# Patient Record
Sex: MALE | Race: WHITE | NOT HISPANIC OR LATINO | Employment: FULL TIME | URBAN - NONMETROPOLITAN AREA
[De-identification: names, ages, dates, MRNs, and addresses within clinical notes are randomized per-mention and may not be internally consistent; named-entity substitution may affect disease eponyms.]

---

## 2023-06-16 ENCOUNTER — APPOINTMENT (OUTPATIENT)
Dept: GENERAL RADIOLOGY | Facility: HOSPITAL | Age: 33
End: 2023-06-16

## 2023-06-16 ENCOUNTER — HOSPITAL ENCOUNTER (EMERGENCY)
Facility: HOSPITAL | Age: 33
Discharge: HOME | End: 2023-06-16

## 2023-06-16 VITALS
HEART RATE: 74 BPM | WEIGHT: 200 LBS | HEIGHT: 68 IN | RESPIRATION RATE: 18 BRPM | OXYGEN SATURATION: 100 % | TEMPERATURE: 98.2 F | BODY MASS INDEX: 30.31 KG/M2 | SYSTOLIC BLOOD PRESSURE: 131 MMHG | DIASTOLIC BLOOD PRESSURE: 84 MMHG

## 2023-06-16 DIAGNOSIS — S63.259A DISLOCATION OF FINGER, INITIAL ENCOUNTER: ICD-10-CM

## 2023-06-16 DIAGNOSIS — S62.638A: Primary | ICD-10-CM

## 2023-06-16 PROCEDURE — 73030 X-RAY EXAM OF SHOULDER: CPT

## 2023-06-16 PROCEDURE — 73120 X-RAY EXAM OF HAND: CPT

## 2023-06-16 PROCEDURE — 73060 X-RAY EXAM OF HUMERUS: CPT

## 2023-06-16 PROCEDURE — 73130 X-RAY EXAM OF HAND: CPT

## 2023-06-16 RX ORDER — LIDOCAINE HYDROCHLORIDE 20 MG/ML
10 INJECTION, SOLUTION INFILTRATION; PERINEURAL ONCE
Status: COMPLETED | OUTPATIENT
Start: 2023-06-16 | End: 2023-06-16

## 2023-06-16 RX ORDER — HYDROCODONE BITARTRATE AND ACETAMINOPHEN 5; 325 MG/1; MG/1
1 TABLET ORAL ONCE
Status: COMPLETED | OUTPATIENT
Start: 2023-06-16 | End: 2023-06-16

## 2023-06-16 RX ORDER — HYDROCODONE BITARTRATE AND ACETAMINOPHEN 5; 325 MG/1; MG/1
1 TABLET ORAL EVERY 8 HOURS PRN
Qty: 10 TABLET | Refills: 0 | Status: SHIPPED | OUTPATIENT
Start: 2023-06-16

## 2023-06-16 RX ADMIN — LIDOCAINE HYDROCHLORIDE 10 ML: 20 INJECTION, SOLUTION INFILTRATION; PERINEURAL at 13:24

## 2023-06-16 RX ADMIN — HYDROCODONE BITARTRATE AND ACETAMINOPHEN 1 TABLET: 5; 325 TABLET ORAL at 13:24

## 2023-06-16 NOTE — Clinical Note
[facility] EMERGENCY DEPARTMENT  [facility] [address]  Phone: [phone]    Johnson Mcgrath was seen and treated in our emergency department on 6/16/2023.  He may return to work on 06/19/2023.         Thank you for choosing [facility].    Nahed Ovalles, APRN

## 2023-06-16 NOTE — DISCHARGE INSTRUCTIONS
It was very nice to meet you, Johnson. Thank you for allowing us to take care of you today at [facility].    Your evaluation today did not show any emergent findings or have any emergent indications for admission to the hospital.     Please understand that an ER evaluation is just the start of your evaluation. We do the best we can, but we are often unable to fully figure out what is causing your symptoms from one evaluation. Thus, our goal is to determine whether you need to be evaluated in the hospital or if it is safe for you to go home and see other doctors such as a primary care physician or a specialist on an outpatient basis.     Like we discussed, it is very important that you follow up with your primary care doctor (call them to set up an appointment) within the next few days or as soon as possible so that you can be re-evaluated for improvement in your symptoms or for any other questions.  I have provided you with the patient connection information.  Please be expecting a call to schedule and set you up with a primary care provider.  I have also provided you with orthopedic surgeon on-call, Dr. Knott office address as well as his phone number.  Please call and make an appointment for further follow-up, evaluation and treatment upon ED discharge.    A copy of your results should be included in your paperwork. If you were prescribed any medications, please take them as directed or call us back with any questions.    Please return to the emergency room within 12-48 hours if you experience fever, chills, chest pain or shortness of breath, pain with inspiration/expiration, pain that travels to your arms, neck or back, nausea, vomiting, severe headache, tearing pain in your chest, dizziness, feel as though you are about to pass out, have any worsening symptoms, or any other concerns.

## 2023-06-18 NOTE — ED PROVIDER NOTES
Subjective   History of Present Illness  Patient is a 31yo male presenting to the emergency department for a fall. Patient states he was getting out of the shower when he stepped on one of his children's toys in the floor causing him to lose his balance and fall. Patient denies hitting his head. He is complaining of no head, neck, or back pain. Denies loss of consciousness. He is complaining of right shoulder pain, right hand pain and right pinky finger pain. Pinky finger does appear to have a deformity. Patient is denying any other injury.     Review of Systems   Musculoskeletal:  Positive for arthralgias and myalgias.        Right shoulder, hand, and pinky finger pain noted after falling out of shower this morning. Denies any neck or back pain. No headache noted. No loss of consciousness.    All other systems reviewed and are negative.    History reviewed. No pertinent past medical history.    No Known Allergies    History reviewed. No pertinent surgical history.    History reviewed. No pertinent family history.    Social History     Socioeconomic History    Marital status: Single           Objective   Physical Exam  Vitals and nursing note reviewed.   Constitutional:       Appearance: Normal appearance.      Comments: Nontoxic appearing. In no acute distress.    HENT:      Head: Normocephalic and atraumatic.      Right Ear: External ear normal.      Left Ear: External ear normal.      Nose: Nose normal.      Mouth/Throat:      Mouth: Mucous membranes are moist.      Pharynx: Oropharynx is clear.   Eyes:      Extraocular Movements: Extraocular movements intact.      Conjunctiva/sclera: Conjunctivae normal.      Pupils: Pupils are equal, round, and reactive to light.   Cardiovascular:      Rate and Rhythm: Normal rate and regular rhythm.      Pulses: Normal pulses.      Heart sounds: Normal heart sounds.   Pulmonary:      Effort: Pulmonary effort is normal.      Breath sounds: Normal breath sounds.   Abdominal:       General: Abdomen is flat. Bowel sounds are normal. There is no distension.      Palpations: Abdomen is soft.      Tenderness: There is no abdominal tenderness. There is no right CVA tenderness, left CVA tenderness or guarding.      Hernia: No hernia is present.   Musculoskeletal:         General: Swelling, tenderness, deformity and signs of injury present. Normal range of motion.      Right shoulder: Tenderness present. No swelling or deformity. Normal range of motion. Normal strength. Normal pulse.      Left shoulder: Normal.      Right upper arm: Normal.      Left upper arm: Normal.      Right elbow: Normal.      Left elbow: Normal.      Right forearm: Normal.      Left forearm: Normal.      Right wrist: Normal.      Left wrist: Normal.      Right hand: Swelling, deformity and tenderness present.      Left hand: Normal.      Cervical back: Normal range of motion and neck supple.      Right lower leg: No edema.      Left lower leg: No edema.      Comments: Tenderness and swelling noted to the right hand. Deformity noted to the right pinky finger. Radial and ulnar pulses are strong, equal and palpable bilaterally. Patient is able to move all fingers on affected hand without difficulty other than right pinky. Patient has full ROM to right shoulder, elbow, and wrist. No abrasions, erythema or lacerations noted to the affected extremity.    Skin:     General: Skin is warm and dry.      Capillary Refill: Capillary refill takes less than 2 seconds.   Neurological:      General: No focal deficit present.      Mental Status: He is alert and oriented to person, place, and time.   Psychiatric:         Mood and Affect: Mood normal.         Behavior: Behavior normal.         Thought Content: Thought content normal.         Judgment: Judgment normal.       FX Dislocation    Date/Time: 6/18/2023 1:50 PM  Performed by: Nahed Ovalles APRN  Authorized by: Nahed Ovalles APRN     Consent:     Consent obtained:  Verbal     Consent given by:  Patient    Risks, benefits, and alternatives were discussed: yes      Risks discussed:  Pain, vascular damage and nerve damage    Alternatives discussed:  Delayed treatment, alternative treatment and referral  Universal protocol:     Procedure explained and questions answered to patient or proxy's satisfaction: yes      Test results available: yes      Imaging studies available: yes      Site/side marked: yes      Immediately prior to procedure, a time out was called: yes      Patient identity confirmed:  Verbally with patient and arm band  Injury:     Injury location:  Finger    Finger injury location:  R little finger    Finger fracture type: distal phalanx fracture      MCP joint involved: no    Pre-procedure details:     Distal neurologic exam:  Normal    Distal perfusion: distal pulses strong and brisk capillary refill      Range of motion: reduced    Sedation:     Sedation type:  None  Anesthesia:     Anesthesia method:  Nerve block    Block location:  Digital block of right little finger    Block needle gauge:  27 G    Block anesthetic:  Lidocaine 2% w/o epi    Block injection procedure:  Anatomic landmarks identified, anatomic landmarks palpated, negative aspiration for blood, introduced needle and incremental injection    Block outcome:  Anesthesia achieved  Procedure details:     Manipulation performed: yes      Reduction successful: yes      X-ray confirmed reduction: yes      Immobilization:  Splint    Splint type:  Finger    Supplies used:  Aluminum splint    Splint applied and adjusted personally by me: Splint applied by nursing staff and adjusted personally by me.    Post-procedure details:     Distal neurologic exam:  Normal    Distal perfusion: distal pulses strong and brisk capillary refill      Range of motion: improved      Procedure completion:  Tolerated well, no immediate complications  Comments:      Repeat xr reveals reduction and proper alignment of the fifth finger.  Repeat xr also reveals small fracture fragment arising from the base of middle phalanx of the fifth finger anteriorly. Patient tolerated procedure well without complications. Patient was instructed to follow up with orthopedics regarding fracture and reduction of dislocation. Patient was given Dr. De Oliveira, orthopedics office information and instructed to follow up with him for further evaluation and treatment.            ED Course  ED Course as of 06/18/23 1802   Fri Jun 16, 2023   1304 X-ray of the right hand reveals a lateral dislocation of the proximal interphalangeal joint of the fifth finger.  No fracture.  This was reviewed and interpreted by Dr. Natalie Davison.     X-ray of the right humerus reveals no fracture or any other acute osseous abnormality.  This was reviewed and interpreted by Dr. Earl Draper. [KF]   1400 X-ray of the right hand was reviewed and interpreted by Dr. Marko Ogden.  Reveals reduction of the PIP joint space dislocation.  The fifth finger is now normally aligned.  Small fracture fragment arising from the base of the middle phalanx of the fifth finger anteriorly. [KF]   1407 X-ray of the right shoulder reveals no acute osseous finding.  Was reviewed and interpreted by Dr. Logan Epstein. [KF]      ED Course User Index  [KF] Nahed Ovalles, APRN                                           Medical Decision Making  Johnson Mcgrath is a 32 y.o. male who presents to the ED for a fall. Patient states he was getting out of the shower when he stepped on one of his children's toys in the floor causing him to lose his balance and fall. Patient denies hitting his head. He is complaining of no head, neck, or back pain. Denies loss of consciousness. He is complaining of right shoulder pain, right hand pain and right pinky finger pain. Pinky finger does appear to have a deformity. Patient is denying any other injury.     Patient was non-toxic appearing on arrival. No acute distress was noted. Past  medical history, surgical history, and medication regimen reviewed.     Vital signs stable.      Patient's presentation raises suspicion for differentials including, but not limited to, shoulder dislocation, humerus fracture, finger fracture, finger dislocation.     Please refer to ED course for imaging results.    Medications administered during ED encounter.   lidocaine (XYLOCAINE) 2% injection 10 mL (10 mL Injection Given 6/16/23 1324)  HYDROcodone-acetaminophen (NORCO) 5-325 MG per tablet 1 tablet (1 tablet Oral Given 6/16/23 1324)  On re-evaluation, patient remained hemodynamically stable.     Given findings described above, patient's presentation is likely consistent with closed displaced fracture of the distal phalanx of the fifth finger associated with a lateral dislocation of the fifth finger of the PIP joint. I have a low suspicion for shoulder dislocation or humerus fracture at this point in their ED course.      Treatment plan and reduction of finger was discussed with attending physician, Dr. Balderas.     I discussed in detail with the patient his imaging results.  We discussed pain relief management including a prescription to go home with.  Patient will be placed in a finger splint and was advised to follow-up with orthopedic surgeon on-call, Dr. De Oliveira.  He was provided his office information and phone number upon discharge. I answered all the questions regarding the emergency department evaluation, diagnosis, and treatment plan in plain and simple language that was understandable. I said that there is always some diagnostic uncertainty in the ER and went over the fact that the symptoms may change or new symptoms may reveal themselves after being discharged. Because of this, I said that it is very important that Ornelas follows up, by calling as soon as possible to set up an appointment, with the orthopedic surgeon within the next few days or as soon as reasonably possible so that the symptoms can be  re-evaluated for improvement or for any other questions. I also gave Johnson common sense return precautions and encouraged a quick return to the emergency department within 24 - 48hrs if there are any new, worsening, or concerning symptoms. The patient verbalized understanding of the discharge instructions and agreed with them. Johnson was discharged in stable condition and was observed ambulating out of the ER.    Problems Addressed:  Closed displaced fracture of distal phalanx of little finger, unspecified laterality, initial encounter: complicated acute illness or injury  Dislocation of finger, initial encounter: complicated acute illness or injury    Amount and/or Complexity of Data Reviewed  Radiology: ordered.    Risk  Prescription drug management.        Final diagnoses:   Closed displaced fracture of distal phalanx of little finger, unspecified laterality, initial encounter   Dislocation of finger, initial encounter       ED Disposition  ED Disposition       ED Disposition   Discharge    Condition   Stable    Comment   --               Arthur De Oliveira MD  [address] [place], [place] [id number] [phone]    Schedule an appointment as soon as possible for a visit       [facility]  [address] [place], [place] [address] [phone]    If symptoms worsen    PATIENT CONNECTION - [place] [address]  [phone]  Schedule an appointment as soon as possible for a visit            Medication List        New Prescriptions      HYDROcodone-acetaminophen 5-325 MG per tablet  Commonly known as: NORCO  Take 1 tablet by mouth Every 8 (Eight) Hours As Needed for Moderate Pain.               Where to Get Your Medications        These medications were sent to [id number] - [place], [place] - [address] AT [facility] [address] - [phone]  - [phone] FX  [address], [facility] [address]      Phone: [phone]    HYDROcodone-acetaminophen 5-325 MG per tablet            Nahed Ovalles, APRN  06/18/23 1807